# Patient Record
Sex: MALE | Race: WHITE
[De-identification: names, ages, dates, MRNs, and addresses within clinical notes are randomized per-mention and may not be internally consistent; named-entity substitution may affect disease eponyms.]

---

## 2019-04-01 ENCOUNTER — HOSPITAL ENCOUNTER (OUTPATIENT)
Dept: HOSPITAL 61 - PCVCIMAG | Age: 49
Discharge: HOME | End: 2019-04-01
Attending: INTERNAL MEDICINE
Payer: COMMERCIAL

## 2019-04-01 DIAGNOSIS — I10: ICD-10-CM

## 2019-04-01 DIAGNOSIS — E78.5: ICD-10-CM

## 2019-04-01 DIAGNOSIS — R07.9: Primary | ICD-10-CM

## 2019-04-01 PROCEDURE — 93351 STRESS TTE COMPLETE: CPT

## 2019-04-01 PROCEDURE — 93325 DOPPLER ECHO COLOR FLOW MAPG: CPT

## 2019-04-01 NOTE — PCVCIMAG
--------------- APPROVED REPORT --------------





Study performed:  04/01/2019 13:44:56



Exam:  Stress Echocardiogram

Indication: Chest pain , Hypertension, Hyperlipidemia

Patient Location: Echo lab

Stress Nurse: Jaida Amaya RN

Status: routine



Ht: 5 ft 10 in  

HR: 76 bpm      BP: 132/98 mmHg

Rhythm: NSR



Procedure

The patient underwent an Exercise Stress Test using the Jonh 

Protocol. Blood pressure, heart rate, and EKG were monitored.

An Echocardiogram was performed by technician in four stages in quad 

fashion.  At peak stress, four selected images were obtained and 

placed side by side with resting images for comparison.



Stress Test Details

Stress Test:  Exercise stress testing was performed using a Jonh 

protocol.

HR

Resting HR:            76 bpmMax Heart Rate (APMHR): 173 bpm 

Max HR Achieved:  176 bpmTarget HR (85% APMHR): 147 bpm

% of APMHR:         101

Recovery HR:            105 bpm

HR response to stress: Normal HR response to stress



BP

Resting BP:  132/98 mmHg

Max BP:       184/78 mmHg

Recovery BP:       178/88 mmHg

BP response to stress: Normal blood pressure response to 

stress.

ECG

Resting ECG:  Sinus Rhythm

Stress ECG:     Sinus Rhythm

ST Change: Normal

Maximum ST Deviation: 0 mm

Arrhythmia:    rare PVC

Recovery ECG: Sinus Rhythm

Recovery ST Change: Normal

Recovery ST Deviation: 0 mm

Recovery Arrhythmia: None



Clinical

Reason for Termination: Maximal effort, Dyspnea

Stress Symptoms: Dyspnea

Exercise duration: 10 min sec

Highest Stage Achieved: Stage 4: 4.2 mph at 16% grade. 

Exercise capacity: 13.4 METs

Overall Exercise Capacity for Age: Normal

Scale: Sedentary

Angina Score: None



Stress ECG Conclusion

ECG: Non-ischemic

Clinical: Non-ischemic

[facility] Treadmill Score is 10.0 which is Low risk.



Pre-Stress Echo

The resting Echocardiogram showed normal left ventricular 

contractility with an estimated Ejection Fraction of about >55%. 

Normal wall motion in all segments on baseline images.



Post-Stress Echo

The stress Echocardiogram showed normal left ventricular 

contractility with an estimated Ejection Fraction of about 65%. 

Normal augmentation of wall motion in all segments on post stress 

images.



Clinical

No clinical or ECG evidence for ischemia.



Conclusion

Clinical Response:  Non-ischemic

Exercise Capacity:  Average

Stress ECG Response:  Non-ischemic

Stress Echo Images:  Non-ischemic

The left ventricle is normal in size and wall thickness in both the 

rest and stress images.

Normal stress echocardiogram with maximal exercise stress. 



Other Information

Study Quality: Adequate



<Conclusion>

The left ventricle is normal in size and wall thickness in both the 

rest and stress images.

Normal stress echocardiogram with maximal exercise stress.